# Patient Record
Sex: MALE | Race: OTHER | Employment: FULL TIME | URBAN - METROPOLITAN AREA
[De-identification: names, ages, dates, MRNs, and addresses within clinical notes are randomized per-mention and may not be internally consistent; named-entity substitution may affect disease eponyms.]

---

## 2018-03-16 ENCOUNTER — APPOINTMENT (OUTPATIENT)
Dept: GENERAL RADIOLOGY | Age: 33
End: 2018-03-16
Attending: EMERGENCY MEDICINE
Payer: SELF-PAY

## 2018-03-16 ENCOUNTER — APPOINTMENT (OUTPATIENT)
Dept: CT IMAGING | Age: 33
End: 2018-03-16
Attending: EMERGENCY MEDICINE
Payer: SELF-PAY

## 2018-03-16 ENCOUNTER — HOSPITAL ENCOUNTER (EMERGENCY)
Age: 33
Discharge: HOME | End: 2018-03-16
Attending: EMERGENCY MEDICINE
Payer: SELF-PAY

## 2018-03-16 VITALS
WEIGHT: 180 LBS | BODY MASS INDEX: 27.28 KG/M2 | DIASTOLIC BLOOD PRESSURE: 78 MMHG | HEIGHT: 68 IN | SYSTOLIC BLOOD PRESSURE: 168 MMHG | RESPIRATION RATE: 18 BRPM | TEMPERATURE: 98.8 F | HEART RATE: 99 BPM | OXYGEN SATURATION: 99 %

## 2018-03-16 DIAGNOSIS — R10.13 DYSPEPSIA: ICD-10-CM

## 2018-03-16 DIAGNOSIS — R55 SYNCOPE AND COLLAPSE: Primary | ICD-10-CM

## 2018-03-16 LAB
ALBUMIN SERPL-MCNC: 4.1 G/DL (ref 3.5–5)
ALBUMIN/GLOB SERPL: 1.1 {RATIO} (ref 1.2–3.5)
ALP SERPL-CCNC: 75 U/L (ref 50–136)
ALT SERPL-CCNC: 63 U/L (ref 12–65)
ANION GAP SERPL CALC-SCNC: 6 MMOL/L (ref 7–16)
AST SERPL-CCNC: 23 U/L (ref 15–37)
ATRIAL RATE: 90 BPM
BASOPHILS # BLD: 0.1 K/UL (ref 0–0.2)
BASOPHILS NFR BLD: 1 % (ref 0–2)
BILIRUB SERPL-MCNC: 0.4 MG/DL (ref 0.2–1.1)
BUN SERPL-MCNC: 13 MG/DL (ref 6–23)
CALCIUM SERPL-MCNC: 9.2 MG/DL (ref 8.3–10.4)
CALCULATED P AXIS, ECG09: 77 DEGREES
CALCULATED R AXIS, ECG10: 79 DEGREES
CALCULATED T AXIS, ECG11: 59 DEGREES
CHLORIDE SERPL-SCNC: 104 MMOL/L (ref 98–107)
CO2 SERPL-SCNC: 32 MMOL/L (ref 21–32)
CREAT SERPL-MCNC: 1.16 MG/DL (ref 0.8–1.5)
DIAGNOSIS, 93000: NORMAL
DIFFERENTIAL METHOD BLD: ABNORMAL
EOSINOPHIL # BLD: 0.2 K/UL (ref 0–0.8)
EOSINOPHIL NFR BLD: 2 % (ref 0.5–7.8)
ERYTHROCYTE [DISTWIDTH] IN BLOOD BY AUTOMATED COUNT: 12.8 % (ref 11.9–14.6)
GLOBULIN SER CALC-MCNC: 3.8 G/DL (ref 2.3–3.5)
GLUCOSE SERPL-MCNC: 102 MG/DL (ref 65–100)
HCT VFR BLD AUTO: 46.3 % (ref 41.1–50.3)
HGB BLD-MCNC: 15.3 G/DL (ref 13.6–17.2)
IMM GRANULOCYTES # BLD: 0 K/UL (ref 0–0.5)
IMM GRANULOCYTES NFR BLD AUTO: 0 % (ref 0–5)
LYMPHOCYTES # BLD: 2.6 K/UL (ref 0.5–4.6)
LYMPHOCYTES NFR BLD: 33 % (ref 13–44)
MCH RBC QN AUTO: 26.6 PG (ref 26.1–32.9)
MCHC RBC AUTO-ENTMCNC: 33 G/DL (ref 31.4–35)
MCV RBC AUTO: 80.4 FL (ref 79.6–97.8)
MONOCYTES # BLD: 0.7 K/UL (ref 0.1–1.3)
MONOCYTES NFR BLD: 9 % (ref 4–12)
NEUTS SEG # BLD: 4.4 K/UL (ref 1.7–8.2)
NEUTS SEG NFR BLD: 55 % (ref 43–78)
P-R INTERVAL, ECG05: 148 MS
PLATELET # BLD AUTO: 205 K/UL (ref 150–450)
PMV BLD AUTO: 10.7 FL (ref 10.8–14.1)
POTASSIUM SERPL-SCNC: 3.7 MMOL/L (ref 3.5–5.1)
PROT SERPL-MCNC: 7.9 G/DL (ref 6.3–8.2)
Q-T INTERVAL, ECG07: 326 MS
QRS DURATION, ECG06: 88 MS
QTC CALCULATION (BEZET), ECG08: 398 MS
RBC # BLD AUTO: 5.76 M/UL (ref 4.23–5.67)
SODIUM SERPL-SCNC: 142 MMOL/L (ref 136–145)
TROPONIN I BLD-MCNC: 0 NG/ML (ref 0.02–0.05)
VENTRICULAR RATE, ECG03: 90 BPM
WBC # BLD AUTO: 7.9 K/UL (ref 4.3–11.1)

## 2018-03-16 PROCEDURE — 80053 COMPREHEN METABOLIC PANEL: CPT | Performed by: EMERGENCY MEDICINE

## 2018-03-16 PROCEDURE — 81003 URINALYSIS AUTO W/O SCOPE: CPT | Performed by: EMERGENCY MEDICINE

## 2018-03-16 PROCEDURE — 99284 EMERGENCY DEPT VISIT MOD MDM: CPT | Performed by: EMERGENCY MEDICINE

## 2018-03-16 PROCEDURE — 70450 CT HEAD/BRAIN W/O DYE: CPT

## 2018-03-16 PROCEDURE — 93005 ELECTROCARDIOGRAM TRACING: CPT | Performed by: EMERGENCY MEDICINE

## 2018-03-16 PROCEDURE — 85025 COMPLETE CBC W/AUTO DIFF WBC: CPT | Performed by: EMERGENCY MEDICINE

## 2018-03-16 PROCEDURE — 74019 RADEX ABDOMEN 2 VIEWS: CPT

## 2018-03-16 PROCEDURE — 84484 ASSAY OF TROPONIN QUANT: CPT

## 2018-03-16 RX ORDER — FAMOTIDINE 20 MG/1
20 TABLET, FILM COATED ORAL 2 TIMES DAILY
Qty: 20 TAB | Refills: 0 | Status: SHIPPED | OUTPATIENT
Start: 2018-03-16 | End: 2018-03-26

## 2018-03-16 NOTE — ED NOTES
I have reviewed discharge instructions with the patient. The patient verbalized understanding. Patient left ED via Discharge Method: ambulatory to Home with wife. Opportunity for questions and clarification provided. Patient given 1 scripts. To continue your aftercare when you leave the hospital, you may receive an automated call from our care team to check in on how you are doing. This is a free service and part of our promise to provide the best care and service to meet your aftercare needs.  If you have questions, or wish to unsubscribe from this service please call [phone]. Thank you for Choosing our [facility] Emergency Department.

## 2018-03-16 NOTE — ED TRIAGE NOTES
Pt c/o dizziness for one month with abdominal bloating that is making his heart rate slow down. Pt states he also has palpitations. This morning he woke up and passed out.

## 2018-03-16 NOTE — ED PROVIDER NOTES
HPI Comments: 68-year-old male had a syncopal episode this morning. He states he awoke and sat up and felt lightheaded. He walked 20 feet to the kitchen to drink some water and then on the way back to the bedroom he passed out. He did feel worse prior to passing out and felt very lightheaded and did have some warning that this was going to occur. He denies any chest pain, trouble breathing or abdominal pain at that time. His wife states he was unconscious for 5 seconds and there was no seizure activity. Patient was not incontinent. Patient gives a several month history of intermittent abdominal bloating with eating and intermittent lightheadedness and dizziness upon sitting up and standing up for the same duration are at least 1 month. He would drink some water to help with the symptoms and that's what he tried to do again today. Patient is a 34 y.o. male presenting with dizziness and syncope. The history is provided by the patient. Dizziness   This is a recurrent problem. Episode onset: 1 month. The problem has been resolved. There was no focality noted. Pertinent negatives include no focal weakness, no loss of sensation, no loss of balance, no speech difficulty, no memory loss, no movement disorder, no visual change, no auditory change, no mental status change and no disorientation. There has been no fever. Pertinent negatives include no shortness of breath, no chest pain, no vomiting, no headaches (denies headache but complains of intermittent head heaviness with visual disturbances.) and no nausea. Syncope    This is a new problem. The current episode started 6 to 12 hours ago. Episode frequency: 1. The problem has been resolved. He lost consciousness for a period of less than one minute. The problem is associated with normal activity. Associated symptoms include abdominal pain (int for 1-3 months), dizziness and light-headedness.  Pertinent negatives include no visual change, no chest pain, no palpitations, no fever, no nausea, no vomiting, no congestion, no headaches (denies headache but complains of intermittent head heaviness with visual disturbances.), no back pain, no focal weakness, no seizures, no weakness, no melena and no head injury. He has tried nothing for the symptoms. His past medical history is significant for syncope. History reviewed. No pertinent past medical history. History reviewed. No pertinent surgical history. History reviewed. No pertinent family history. Social History     Social History    Marital status: SINGLE     Spouse name: N/A    Number of children: N/A    Years of education: N/A     Occupational History    Not on file. Social History Main Topics    Smoking status: Never Smoker    Smokeless tobacco: Never Used    Alcohol use No    Drug use: No    Sexual activity: Not on file     Other Topics Concern    Not on file     Social History Narrative    No narrative on file         ALLERGIES: Review of patient's allergies indicates no known allergies. Review of Systems   Constitutional: Negative for chills and fever. HENT: Negative for congestion, rhinorrhea and sore throat. Eyes: Positive for visual disturbance (some intermittent visual disturbances while staring at computer monitorfor 1-3 months). Negative for photophobia and redness. Respiratory: Negative for cough and shortness of breath. Cardiovascular: Positive for syncope. Negative for chest pain, palpitations and leg swelling. Gastrointestinal: Positive for abdominal pain (int for 1-3 months). Negative for blood in stool, diarrhea, melena, nausea and vomiting. Endocrine: Negative for polydipsia and polyuria. Genitourinary: Negative for dysuria, frequency and urgency. Musculoskeletal: Negative for back pain and myalgias. Neurological: Positive for dizziness, syncope and light-headedness.  Negative for focal weakness, seizures, speech difficulty, weakness, numbness, headaches (denies headache but complains of intermittent head heaviness with visual disturbances.) and loss of balance. Psychiatric/Behavioral: Negative for memory loss. Vitals:    03/16/18 1629   Pulse: (!) 113   Resp: 17   Temp: 98.6 °F (37 °C)   SpO2: 98%   Weight: 81.6 kg (180 lb)   Height: 5' 8\" (1.727 m)            Physical Exam   Constitutional: He is oriented to person, place, and time. He appears well-developed and well-nourished. HENT:   Mouth/Throat: Oropharynx is clear and moist. No oropharyngeal exudate. Eyes: Conjunctivae are normal. Pupils are equal, round, and reactive to light. Neck: Neck supple. Cardiovascular: Normal rate, regular rhythm and normal heart sounds. Pulmonary/Chest: Effort normal and breath sounds normal.   Abdominal: Soft. Bowel sounds are normal. He exhibits no distension. There is no tenderness. There is no rebound and no guarding. Musculoskeletal: Normal range of motion. He exhibits no edema or tenderness. Lymphadenopathy:     He has no cervical adenopathy. Neurological: He is alert and oriented to person, place, and time. No cranial nerve deficit or sensory deficit. He exhibits normal muscle tone. Coordination normal. GCS eye subscore is 4. GCS verbal subscore is 5. GCS motor subscore is 6. Skin: Skin is warm and dry. Nursing note and vitals reviewed. MDM  Number of Diagnoses or Management Options  Diagnosis management comments: Patient has multiple complex complaints over the last several months culminating in a syncopal episode today. Likely vasovagal syncope today. Workup is unremarkable. Patient was referred for outpatient follow-up with cardiology for syncopal episode and given referral to a primary care doctor.        Amount and/or Complexity of Data Reviewed  Clinical lab tests: ordered and reviewed (Results for orders placed or performed during the hospital encounter of 03/16/18  -CBC WITH AUTOMATED DIFF       Result Value                         Ref Range                       WBC                                               7.9                           4.3 - 11.1 K/uL                 RBC                                               5.76 (H)                      4.23 - 5.67 M/uL                HGB                                               15.3                          13.6 - 17.2 g/dL                HCT                                               46.3                          41.1 - 50.3 %                   MCV                                               80.4                          79.6 - 97.8 FL                  MCH                                               26.6                          26.1 - 32.9 PG                  MCHC                                              33.0                          31.4 - 35.0 g/dL                RDW                                               12.8                          11.9 - 14.6 %                   PLATELET                                          205                           150 - 450 K/uL                  MPV                                               10.7 (L)                      10.8 - 14.1 FL                  DF                                                AUTOMATED                                                     NEUTROPHILS                                       55                            43 - 78 %                       LYMPHOCYTES                                       33                            13 - 44 %                       MONOCYTES                                         9                             4.0 - 12.0 %                    EOSINOPHILS                                       2                             0.5 - 7.8 %                     BASOPHILS                                         1                             0.0 - 2.0 %                     IMMATURE GRANULOCYTES                             0 0.0 - 5.0 %                     ABS. NEUTROPHILS                                  4.4                           1.7 - 8.2 K/UL                  ABS. LYMPHOCYTES                                  2.6                           0.5 - 4.6 K/UL                  ABS. MONOCYTES                                    0.7                           0.1 - 1.3 K/UL                  ABS. EOSINOPHILS                                  0.2                           0.0 - 0.8 K/UL                  ABS. BASOPHILS                                    0.1                           0.0 - 0.2 K/UL                  ABS. IMM.  GRANS.                                  0.0                           0.0 - 0.5 K/UL             -METABOLIC PANEL, COMPREHENSIVE       Result                                            Value                         Ref Range                       Sodium                                            142                           136 - 145 mmol/L                Potassium                                         3.7                           3.5 - 5.1 mmol/L                Chloride                                          104                           98 - 107 mmol/L                 CO2                                               32                            21 - 32 mmol/L                  Anion gap                                         6 (L)                         7 - 16 mmol/L                   Glucose                                           102 (H)                       65 - 100 mg/dL                  BUN                                               13                            6 - 23 MG/DL                    Creatinine                                        1.16                          0.8 - 1.5 MG/DL                 GFR est AA                                        >60                           >60 ml/min/1.73m2               GFR est non-AA                                    >60                           >60 ml/min/1.73m2               Calcium                                           9.2                           8.3 - 10.4 MG/DL                Bilirubin, total                                  0.4                           0.2 - 1.1 MG/DL                 ALT (SGPT)                                        63                            12 - 65 U/L                     AST (SGOT)                                        23                            15 - 37 U/L                     Alk. phosphatase                                  75                            50 - 136 U/L                    Protein, total                                    7.9                           6.3 - 8.2 g/dL                  Albumin                                           4.1                           3.5 - 5.0 g/dL                  Globulin                                          3.8 (H)                       2.3 - 3.5 g/dL                  A-G Ratio                                         1.1 (L)                       1.2 - 3.5                  -POC TROPONIN-I       Result                                            Value                         Ref Range                       Troponin-I (POC)                                  0 (L)                         0.02 - 0.05 ng/ml          )  Tests in the radiology section of CPT®: ordered and reviewed (Xr Abd (ap And Erect Or Decub)    Result Date: 3/16/2018  2 View Abdomen dated March 16, 2018 Reference Exam: None Indication:  Intermittent abdomen bloating and dizziness for one month Findings: 3 images are presented. Bowel gas pattern is normal.  There is no intraperitoneal free air seen on the upright imaging. No suspicious masses or suspicious calcifications are seen. Bony structures are normal.     Impression:  Normal 2 view abdomen.      Ct Head Wo Cont    Result Date: 3/16/2018  Examination: CT scan of the brain without contrast. History: syncope, headaches with visual disturbances, vertigo for 1 month, 33 years Male SYNCOPAL EVENT THIS AM HEADACHE VISUAL CHANGES VERTIGO X 1 MONTH Technique: 5 mm axial imaging of the brain from the posterior fossa to the vertex. Radiation dose reduction techniques were used for this study:  Our CT scanners use one or all of the following: Automated exposure control, adjustment of the mA and/or kVp according to patient's size, iterative reconstruction. Comparison:  None available Findings: The brain parenchyma appears within normal limits for age. The ventricles, sulci are age-appropriate. No intracranial hemorrhage or extra-axial collection is identified. No evidence of acute infarct. No mass effect or midline shift is present. Basal cisterns are intact. The visualized paranasal sinuses and mastoid air cells are clear. The orbits, bones, and soft tissues are normal in appearance. IMPRESSION:  Normal unenhanced CT of the brain for age.   No acute intracranial abnormality.    )  Independent visualization of images, tracings, or specimens: yes (ekg-nsr, nl axis, no hypertrophy, no st-t changes, normal ekg.  )          ED Course       Procedures

## 2018-03-16 NOTE — DISCHARGE INSTRUCTIONS
Vasovagal Syncope: Care Instructions  Your Care Instructions    Vasovagal syncope (say \"vji-unf-XREGloria WEIENR-kuh-pee\")is sudden dizziness or fainting that can be set off by things such as pain, stress, fear, or trauma. You may sweat or feel lightheaded, sick to your stomach, or tingly. The problem causes the heart rate to slow and the blood vessels to widen, or dilate, for a short time. When this happens, blood pools in the lower body, and less blood goes to the brain. You can usually get relief by lying down with your legs raised (elevated). This helps more blood to flow to your brain and may help relieve symptoms like feeling dizzy. Some doctors may recommend a technique that involves tensing your fists and arms. This type of fainting is often easy to predict. For example, it happens to some people when they see blood or have to get a shot. They may feel symptoms before they faint. An episode of vasovagal syncope usually responds well to self-care. Other treatment often isn't needed. But if the fainting keeps happening, your doctor may suggest further treatments. Follow-up care is a key part of your treatment and safety. Be sure to make and go to all appointments, and call your doctor if you are having problems. It's also a good idea to know your test results and keep a list of the medicines you take. How can you care for yourself at home? · Drink plenty of fluids to prevent dehydration. If you have kidney, heart, or liver disease and have to limit fluids, talk with your doctor before you increase your fluid intake. · Try to avoid things that you think may set off vasovagal syncope. · Talk to your doctor about any medicines you take. Some medicines may increase the chance of this condition occurring. · If you feel symptoms, lie down with your legs raised. Talk to your doctor about what to do if your symptoms come back. When should you call for help?   Call 911 anytime you think you may need emergency care. For example, call if:  ? · You have symptoms of a heart problem. These may include:  ¨ Chest pain or pressure. ¨ Severe trouble breathing. ¨ A fast or irregular heartbeat. ? Watch closely for changes in your health, and be sure to contact your doctor if:  ? · You have more episodes of fainting at home. ? · You do not get better as expected. Where can you learn more? Go to http://misha-arelis.info/. Enter L754 in the search box to learn more about \"Vasovagal Syncope: Care Instructions. \"  Current as of: March 20, 2017  Content Version: 11.4  © 4734-4065 [employer]. Care instructions adapted under license by [employer] (which disclaims liability or warranty for this information). If you have questions about a medical condition or this instruction, always ask your healthcare professional. [place] [address] any warranty or liability for your use of this information. Lightheadedness or Faintness: Care Instructions  Your Care Instructions  Lightheadedness is a feeling that you are about to faint or \"pass out. \" You do not feel as if you or your surroundings are moving. It is different from vertigo, which is the feeling that you or things around you are spinning or tilting. Lightheadedness usually goes away or gets better when you lie down. If lightheadedness gets worse, it can lead to a fainting spell. It is common to feel lightheaded from time to time. Lightheadedness usually is not caused by a serious problem. It often is caused by a short-lasting drop in blood pressure and blood flow to your head that occurs when you get up too quickly from a seated or lying position. Follow-up care is a key part of your treatment and safety. Be sure to make and go to all appointments, and call your doctor if you are having problems. It's also a good idea to know your test results and keep a list of the medicines you take.   How can you care for yourself at home? · Lie down for 1 or 2 minutes when you feel lightheaded. After lying down, sit up slowly and remain sitting for 1 to 2 minutes before slowly standing up. · Avoid movements, positions, or activities that have made you lightheaded in the past.  · Get plenty of rest, especially if you have a cold or flu, which can cause lightheadedness. · Make sure you drink plenty of fluids, especially if you have a fever or have been sweating. · Do not drive or put yourself and others in danger while you feel lightheaded. When should you call for help? Call 911 anytime you think you may need emergency care. For example, call if:  ? · You have symptoms of a stroke. These may include:  ¨ Sudden numbness, tingling, weakness, or loss of movement in your face, arm, or leg, especially on only one side of your body. ¨ Sudden vision changes. ¨ Sudden trouble speaking. ¨ Sudden confusion or trouble understanding simple statements. ¨ Sudden problems with walking or balance. ¨ A sudden, severe headache that is different from past headaches. ? · You have symptoms of a heart attack. These may include:  ¨ Chest pain or pressure, or a strange feeling in the chest.  ¨ Sweating. ¨ Shortness of breath. ¨ Nausea or vomiting. ¨ Pain, pressure, or a strange feeling in the back, neck, jaw, or upper belly or in one or both shoulders or arms. ¨ Lightheadedness or sudden weakness. ¨ A fast or irregular heartbeat. After you call 911, the  may tell you to chew 1 adult-strength or 2 to 4 low-dose aspirin. Wait for an ambulance. Do not try to drive yourself. ? Watch closely for changes in your health, and be sure to contact your doctor if:  ? · Your lightheadedness gets worse or does not get better with home care. Where can you learn more? Go to http://misha-arelis.info/. Enter L959 in the search box to learn more about \"Lightheadedness or Faintness: Care Instructions. \"  Current as of: March 20, 2017  Content Version: 11.4  © 8618-3521 [employer]. Care instructions adapted under license by [employer] (which disclaims liability or warranty for this information). If you have questions about a medical condition or this instruction, always ask your healthcare professional. [place] [address] any warranty or liability for your use of this information.